# Patient Record
Sex: MALE | Race: WHITE | NOT HISPANIC OR LATINO | Employment: OTHER | URBAN - NONMETROPOLITAN AREA
[De-identification: names, ages, dates, MRNs, and addresses within clinical notes are randomized per-mention and may not be internally consistent; named-entity substitution may affect disease eponyms.]

---

## 2017-01-13 ENCOUNTER — HOSPITAL ENCOUNTER (OUTPATIENT)
Dept: GENERAL RADIOLOGY | Facility: HOSPITAL | Age: 72
Discharge: HOME | End: 2017-01-13
Attending: UROLOGY

## 2017-01-13 ENCOUNTER — OFFICE VISIT (OUTPATIENT)
Dept: UROLOGY | Facility: CLINIC | Age: 72
End: 2017-01-13

## 2017-01-13 DIAGNOSIS — N20.0 NEPHROLITHIASIS: Primary | ICD-10-CM

## 2017-01-13 DIAGNOSIS — N13.8 BPH (BENIGN PROSTATIC HYPERTROPHY) WITH URINARY OBSTRUCTION: ICD-10-CM

## 2017-01-13 DIAGNOSIS — N40.1 BPH (BENIGN PROSTATIC HYPERTROPHY) WITH URINARY OBSTRUCTION: ICD-10-CM

## 2017-01-13 PROCEDURE — 99204 OFFICE O/P NEW MOD 45 MIN: CPT | Performed by: UROLOGY

## 2017-01-13 RX ORDER — AMOXICILLIN AND CLAVULANATE POTASSIUM 875; 125 MG/1; MG/1
TABLET, FILM COATED ORAL
COMMUNITY
Start: 2016-12-30

## 2017-01-13 RX ORDER — LORATADINE 10 MG/1
CAPSULE, LIQUID FILLED ORAL
Refills: 5 | COMMUNITY
Start: 2017-01-08

## 2017-01-13 RX ORDER — ROSUVASTATIN CALCIUM 10 MG/1
TABLET, FILM COATED ORAL
Refills: 5 | COMMUNITY
Start: 2017-01-08

## 2017-01-13 RX ORDER — HYDROCODONE BITARTRATE AND ACETAMINOPHEN 5; 325 MG/1; MG/1
TABLET ORAL
COMMUNITY
Start: 2017-01-09

## 2017-01-13 RX ORDER — FENOFIBRATE 160 MG/1
TABLET ORAL
Refills: 5 | COMMUNITY
Start: 2017-01-08

## 2017-01-13 RX ORDER — OXYCODONE HYDROCHLORIDE AND ACETAMINOPHEN 5; 325 MG/1; MG/1
2 TABLET ORAL EVERY 4 HOURS PRN
Qty: 20 TABLET | Refills: 0 | Status: SHIPPED | OUTPATIENT
Start: 2017-01-13

## 2017-01-13 RX ORDER — ESOMEPRAZOLE MAGNESIUM 40 MG/1
CAPSULE, DELAYED RELEASE ORAL
Refills: 5 | COMMUNITY
Start: 2017-01-08

## 2017-01-13 RX ORDER — AMLODIPINE BESYLATE 10 MG/1
TABLET ORAL
Refills: 5 | COMMUNITY
Start: 2017-01-08

## 2017-01-13 NOTE — PROGRESS NOTES
Chief Complaint  No chief complaint on file.        SAMSON Paredes is a 71 y.o. male who presents today with a known 3 mm calculus in his distal left ureter.  1 week ago he went to our emergency room complaining of abdominal pain nausea and vomiting and was found to have this on CT scan which I have reviewed.  KUB today shows the stone is persistent overlying the left sacrum.  He's laying down in the office today complaining of significant pain and ongoing nausea.  He's not had kidney stones in the past.  He has had difficulty voiding for several years and currently is taking saw palmetto, medical only.  He states Flomax caused a drop in his blood pressure when combined with his daily amlodipine.  Its explained that this would help him pass the stone so Rapaflo is provided.    There were no vitals filed for this visit.    Past Medical History  Past Medical History   Diagnosis Date   • GERD (gastroesophageal reflux disease)    • High cholesterol    • Hypertension    • Renal disease        Past Surgical History  Past Surgical History   Procedure Laterality Date   • Hernia repair     • Hemorrhoidectomy         Medications  has a current medication list which includes the following prescription(s): amlodipine, amoxicillin-clavulanate, crestor, esomeprazole, fenofibrate, hydrocodone-acetaminophen, and loratadine.      Allergies  No Known Allergies    Social History  Social History     Social History Narrative   • No narrative on file       Family History  He has no family history of bladder or kidney cancer  He has no family history of kidney stones      AUA Symptom Score:      Review of Systems  Review of Systems  Positive for night sweats chills weight loss constipation diarrhea nausea kidney stones frequency urgency back pain but negative and other categories.  Physical Exam  Physical Exam   Constitutional: He is oriented to person, place, and time. He appears well-developed and well-nourished.   HENT:   Head:  Normocephalic and atraumatic.   Neck: Normal range of motion.   Cardiovascular: Normal rate and intact distal pulses.    Pulmonary/Chest: Effort normal. No respiratory distress.   Abdominal: Soft. He exhibits distension. He exhibits no mass. There is tenderness. There is guarding. No hernia. Hernia confirmed negative in the right inguinal area and confirmed negative in the left inguinal area.   Genitourinary: Rectum normal, prostate normal, testes normal and penis normal.   Musculoskeletal: Normal range of motion.   Lymphadenopathy: No inguinal adenopathy noted on the right or left side.   Neurological: He is alert and oriented to person, place, and time.   Skin: Skin is warm and dry.   Psychiatric: He has a normal mood and affect. His behavior is normal.   Vitals reviewed.      Labs Recent and today in the office:  Results for orders placed or performed during the hospital encounter of 01/11/17   Urinalysis With / Culture If Indicated   Result Value Ref Range    Color, UA Yellow     Appearance, UA Clear     Glucose, UA Negative NEGATIVE    Bilirubin, UA Negative NEGATIVE    Ketones, UA Negative NEGATIVE    Specific Gravity, UA 1.020 1.003 - 1.035    Blood, UA Negative NEGATIVE    pH, UA 7.0 5.0 - 8.0    Protein, UA Negative NEGATIVE    Urobilinogen, UA 0.2 0.2    Nitrite, UA Negative NEGATIVE    Leukocytes, UA Negative NEGATIVE    WBC, UA 0-3 0 - 3    RBC, UA 0-3 0 - 3    Epithelial Cells, UA 0-3 0 - 3    Mucus, UA TRACE NONE SEEN   Basic Metabolic Panel   Result Value Ref Range    Sodium 141 137 - 145 mmol/L    Potassium 3.5 3.5 - 5.1 mmol/L    Chloride 103 98 - 107 mmol/L    CO2 28 26 - 30 mmol/L    Anion Gap 14 10 - 20 mmol/L    BUN 18 7 - 20 mg/dL    Creatinine 1.4 (H) 0.6 - 1.3 mg/dL    eGFR 50 mL/min    Glucose 95 74 - 98 mg/dL    Calcium 9.8 8.4 - 10.2 mg/dL   CBC & Differential   Result Value Ref Range    WBC 7.2 4.8 - 10.8 THOUS    RBC 4.48 (L) 4.70 - 6.10 m/uL    Hemoglobin 13.3 (L) 14.0 - 18.0 g/dL     Hematocrit 40 (L) 42 - 52 %    MCV 89.3 80.0 - 94.0 fL    MCH 29.7 27.0 - 31.0 uug    MCHC 33.3 30.0 - 37.0 g/dL    RDW 13.1 11.5 - 14.5 %    Platelets 279 130 - 400 THOUS    Neutrophil Rel % 59.4 37.0 - 80.0 %    Lymphocyte Rel % 22.3 10.0 - 50.0 %    Monocyte Rel % 9.8 0.0 - 12.0 %    Eosinophil Rel % 7.6 (H) 0.0 - 7.0 %    Basophil Rel % 0.60 0.00 - 2.50 %    Immature Granulocyte Rel % 0.30 0.00 - 2.50 %    Neutrophils Absolute 4.29 2.00 - 6.90 THOUS    Lymphocytes Absolute 1.61 0.60 - 3.40 THOUS    Monocytes Absolute 0.71 0.00 - 0.90 THOUS    Eosinophils Absolute 0.55 0.00 - 0.70 THOUS    Basophils Absolute 0.04 0.00 - 0.20 THOUS    Abs Imm Gran 0.02 0.00 - 0.60 THOUS         Assessment & Plan  A trial of medical propulsive therapy is recommended since the stone is small enough that it would pass 90% of the time.  He will return on Monday however to schedule surgery if his pain persists.  If he is not able to handle the pain I suggested he go to the [facility] emergency room in [place] for immediate intervention.  He is provided with Percocet and Rapaflo.

## 2017-01-13 NOTE — MR AVS SNAPSHOT
Johnson Paredes   1/13/2017 8:30 AM   Office Visit    Dept Phone:  [phone]   Encounter #:  [id number]    Provider:  Jagjit Kessler MD   Department:  [facility] UROLOGY                Your Full Care Plan              Today's Medication Changes          These changes are accurate as of: 1/13/17 10:53 AM.  If you have any questions, ask your nurse or doctor.               New Medication(s)Ordered:     oxyCODONE-acetaminophen 5-325 MG per tablet   Commonly known as:  PERCOCET   Take 2 tablets by mouth Every 4 (Four) Hours As Needed for severe pain (7-10).   Started by:  Jagjit Kessler MD            Where to Get Your Medications      You can get these medications from any pharmacy     Bring a paper prescription for each of these medications     oxyCODONE-acetaminophen 5-325 MG per tablet                  Your Updated Medication List          This list is accurate as of: 1/13/17 10:53 AM.  Always use your most recent med list.                amLODIPine 10 MG tablet   Commonly known as:  NORVASC       amoxicillin-clavulanate 875-125 MG per tablet   Commonly known as:  AUGMENTIN       CRESTOR 10 MG tablet   Generic drug:  rosuvastatin       esomeprazole 40 MG capsule   Commonly known as:  nexIUM       fenofibrate 160 MG tablet       HYDROcodone-acetaminophen 5-325 MG per tablet   Commonly known as:  NORCO       Loratadine 10 MG capsule       oxyCODONE-acetaminophen 5-325 MG per tablet   Commonly known as:  PERCOCET   Take 2 tablets by mouth Every 4 (Four) Hours As Needed for severe pain (7-10).               You Were Diagnosed With        Codes Comments    Nephrolithiasis    -  Primary ICD-10-CM: N20.0  ICD-9-CM: 592.0     BPH (benign prostatic hypertrophy) with urinary obstruction     ICD-10-CM: N40.1, N13.8  ICD-9-CM: 600.01, 599.69       Instructions     None    Patient Instructions History      Upcoming Appointments     Visit Type Date Time Department    NEW PATIENT  2017  8:30 AM [facility]      FreeWavz Signup     [facility] FreeWavz allows you to send messages to your doctor, view your test results, renew your prescriptions, schedule appointments, and more. To sign up, go to [employer] and click on the Sign Up Now link in the New User? box. Enter your FreeWavz Activation Code exactly as it appears below along with the last four digits of your Social Security Number and your Date of Birth () to complete the sign-up process. If you do not sign up before the expiration date, you must request a new code.    FreeWavz Activation Code: [email or web address]  Expires: 2017 10:53 AM    If you have questions, you can email [email or web address] or call [phone] to talk to our FreeWavz staff. Remember, FreeWavz is NOT to be used for urgent needs. For medical emergencies, dial 911.               Other Info from Your Visit           Allergies     No Known Allergies      Vital Signs     Smoking Status                   Never Smoker           Problems and Diagnoses Noted     Nephrolithiasis    -  Primary    Enlarged prostate with urinary obstruction

## 2017-01-16 ENCOUNTER — OFFICE VISIT (OUTPATIENT)
Dept: UROLOGY | Facility: CLINIC | Age: 72
End: 2017-01-16

## 2017-01-16 DIAGNOSIS — N13.8 BPH (BENIGN PROSTATIC HYPERTROPHY) WITH URINARY OBSTRUCTION: ICD-10-CM

## 2017-01-16 DIAGNOSIS — N20.0 NEPHROLITHIASIS: ICD-10-CM

## 2017-01-16 DIAGNOSIS — N20.1 URETERAL STONE: Primary | ICD-10-CM

## 2017-01-16 DIAGNOSIS — N40.1 BPH (BENIGN PROSTATIC HYPERTROPHY) WITH URINARY OBSTRUCTION: ICD-10-CM

## 2017-01-16 LAB
BILIRUB BLD-MCNC: NEGATIVE MG/DL
CLARITY, POC: CLEAR
COLOR UR: YELLOW
GLUCOSE UR STRIP-MCNC: NEGATIVE MG/DL
KETONES UR QL: NEGATIVE
LEUKOCYTE EST, POC: NEGATIVE
NITRITE UR-MCNC: NEGATIVE MG/ML
PH UR: 7 [PH] (ref 5–8)
PROT UR STRIP-MCNC: NEGATIVE MG/DL
RBC # UR STRIP: NEGATIVE /UL
SP GR UR: 1.01 (ref 1–1.03)
UROBILINOGEN UR QL: NORMAL

## 2017-01-16 PROCEDURE — 99213 OFFICE O/P EST LOW 20 MIN: CPT | Performed by: UROLOGY

## 2017-01-16 PROCEDURE — 81003 URINALYSIS AUTO W/O SCOPE: CPT | Performed by: UROLOGY

## 2017-01-16 RX ORDER — SILODOSIN 8 MG/1
8 CAPSULE ORAL
Qty: 30 CAPSULE | Refills: 11 | Status: SHIPPED | OUTPATIENT
Start: 2017-01-16

## 2017-01-16 NOTE — PROGRESS NOTES
Chief Complaint  Nephrolithiasis (ureteral stone follow up.)        SAMSON Paredes is a 71 y.o. male who returns today for follow-up of a known 3 mm stone in his distal left ureter.  He states he's had some recurrences of the pain but it was relieved by his Percocet.  He's been tolerating Rapaflo without difficulty and has noticed a significant improvement in his urinary stream.  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    There were no vitals filed for this visit.    Past Medical History  Past Medical History   Diagnosis Date   • GERD (gastroesophageal reflux disease)    • High cholesterol    • Hypertension    • Renal disease        Past Surgical History  Past Surgical History   Procedure Laterality Date   • Hernia repair     • Hemorrhoidectomy         Medications  has a current medication list which includes the following prescription(s): amlodipine, amoxicillin-clavulanate, crestor, esomeprazole, fenofibrate, hydrocodone-acetaminophen, loratadine, and oxycodone-acetaminophen.      Allergies  No Known Allergies    Social History  Social History     Social History Narrative       Family History  He has no family history of bladder or kidney cancer  He has no family history of kidney stones      AUA Symptom Score:      Review of Systems  Review of Systems   Constitutional: Negative.    Genitourinary: Negative.    All other systems reviewed and are negative.      Physical Exam  Physical Exam    Labs Recent and today in the office:  Results for orders placed or performed during the hospital encounter of 01/11/17   Urinalysis With / Culture If Indicated   Result Value Ref Range    Color, UA Yellow     Appearance, UA Clear     Glucose, UA Negative NEGATIVE    Bilirubin, UA Negative NEGATIVE    Ketones, UA Negative NEGATIVE    Specific Gravity, UA 1.020 1.003 - 1.035    Blood, UA Negative NEGATIVE    pH, UA 7.0 5.0 - 8.0    Protein, UA Negative NEGATIVE    Urobilinogen, UA 0.2 0.2    Nitrite, UA Negative  NEGATIVE    Leukocytes, UA Negative NEGATIVE    WBC, UA 0-3 0 - 3    RBC, UA 0-3 0 - 3    Epithelial Cells, UA 0-3 0 - 3    Mucus, UA TRACE NONE SEEN   Basic Metabolic Panel   Result Value Ref Range    Sodium 141 137 - 145 mmol/L    Potassium 3.5 3.5 - 5.1 mmol/L    Chloride 103 98 - 107 mmol/L    CO2 28 26 - 30 mmol/L    Anion Gap 14 10 - 20 mmol/L    BUN 18 7 - 20 mg/dL    Creatinine 1.4 (H) 0.6 - 1.3 mg/dL    eGFR 50 mL/min    Glucose 95 74 - 98 mg/dL    Calcium 9.8 8.4 - 10.2 mg/dL   CBC & Differential   Result Value Ref Range    WBC 7.2 4.8 - 10.8 THOUS    RBC 4.48 (L) 4.70 - 6.10 m/uL    Hemoglobin 13.3 (L) 14.0 - 18.0 g/dL    Hematocrit 40 (L) 42 - 52 %    MCV 89.3 80.0 - 94.0 fL    MCH 29.7 27.0 - 31.0 uug    MCHC 33.3 30.0 - 37.0 g/dL    RDW 13.1 11.5 - 14.5 %    Platelets 279 130 - 400 THOUS    Neutrophil Rel % 59.4 37.0 - 80.0 %    Lymphocyte Rel % 22.3 10.0 - 50.0 %    Monocyte Rel % 9.8 0.0 - 12.0 %    Eosinophil Rel % 7.6 (H) 0.0 - 7.0 %    Basophil Rel % 0.60 0.00 - 2.50 %    Immature Granulocyte Rel % 0.30 0.00 - 2.50 %    Neutrophils Absolute 4.29 2.00 - 6.90 THOUS    Lymphocytes Absolute 1.61 0.60 - 3.40 THOUS    Monocytes Absolute 0.71 0.00 - 0.90 THOUS    Eosinophils Absolute 0.55 0.00 - 0.70 THOUS    Basophils Absolute 0.04 0.00 - 0.20 THOUS    Abs Imm Gran 0.02 0.00 - 0.60 THOUS         Assessment & Plan  Since he is pretty comfortable and his urine is clear I suggested he continue his Rapaflo push fluids and give it another week to see if he can pass the stone spontaneously.  Rapaflo be prescribed for bladder outlet obstruction as well.  He'll return in 1 week or sooner if he wants ureteroscopy but he'll have to wear a stent for 5 days.

## 2017-01-16 NOTE — MR AVS SNAPSHOT
Johnson Paredes   1/16/2017 11:30 AM   Office Visit    Dept Phone:  [phone]   Encounter #:  [id number]    Provider:  Jagjit Kessler MD   Department:  [facility] UROLOGY                Your Full Care Plan              Your Updated Medication List          This list is accurate as of: 1/16/17 11:27 AM.  Always use your most recent med list.                amLODIPine 10 MG tablet   Commonly known as:  NORVASC       amoxicillin-clavulanate 875-125 MG per tablet   Commonly known as:  AUGMENTIN       CRESTOR 10 MG tablet   Generic drug:  rosuvastatin       esomeprazole 40 MG capsule   Commonly known as:  nexIUM       fenofibrate 160 MG tablet       HYDROcodone-acetaminophen 5-325 MG per tablet   Commonly known as:  NORCO       Loratadine 10 MG capsule       oxyCODONE-acetaminophen 5-325 MG per tablet   Commonly known as:  PERCOCET   Take 2 tablets by mouth Every 4 (Four) Hours As Needed for severe pain (7-10).               We Performed the Following     POC Urinalysis Dipstick, Automated       You Were Diagnosed With        Codes Comments    Ureteral stone    -  Primary ICD-10-CM: N20.1  ICD-9-CM: 592.1     Nephrolithiasis     ICD-10-CM: N20.0  ICD-9-CM: 592.0     BPH (benign prostatic hypertrophy) with urinary obstruction     ICD-10-CM: N40.1, N13.8  ICD-9-CM: 600.01, 599.69       Instructions     None    Patient Instructions History      Upcoming Appointments     Visit Type Date Time Department    FOLLOW UP 1/16/2017 11:30 AM [facility]    FOLLOW UP 1/23/2017 10:30 AM [facility]      TaxiBeat Signup     [facility] TaxiBeat allows you to send messages to your doctor, view your test results, renew your prescriptions, schedule appointments, and more. To sign up, go to [employer] and click on the Sign Up Now link in the New User? box. Enter your TaxiBeat Activation Code exactly as it appears below along with the last four digits of  your Social Security Number and your Date of Birth () to complete the sign-up process. If you do not sign up before the expiration date, you must request a new code.    CaptureProof Activation Code: [email or web address]  Expires: 2017 10:53 AM    If you have questions, you can email [email or web address] or call [phone] to talk to our MenInvestt staff. Remember, MenInvestt is NOT to be used for urgent needs. For medical emergencies, dial 911.               Other Info from Your Visit           Your Appointments     2017 11:30 AM EST   Follow Up with Jagjit Kessler MD   [facility] UROLOGY (--)    [address] [place] [phone]           Arrive 15 minutes prior to appointment.            2017 10:30 AM EST   Follow Up with Jagjit Kessler MD   [facility] UROLOGY (--)    [address] [place] [id number]   [phone]           Arrive 15 minutes prior to appointment.              Allergies     No Known Allergies      Reason for Visit     Nephrolithiasis ureteral stone follow up.      Vital Signs     Smoking Status                   Never Smoker           Problems and Diagnoses Noted     Ureteral stone    -  Primary    Nephrolithiasis        Enlarged prostate with urinary obstruction          Results     POC Urinalysis Dipstick, Automated      Component Value Standard Range & Units    Color Yellow Yellow, Straw, Dark Yellow, Fabi    Clarity, UA Clear Clear    Glucose, UA Negative Negative, 1000 mg/dL (3+) mg/dL    Bilirubin Negative Negative    Ketones, UA Negative Negative    Specific Gravity  1.010 1.005 - 1.030    Blood, UA Negative Negative    pH, Urine 7.0 5.0 - 8.0    Protein, POC Negative Negative mg/dL    Urobilinogen, UA Normal Normal    Leukocytes Negative Negative    Nitrite, UA Negative Negative

## 2017-01-23 ENCOUNTER — OFFICE VISIT (OUTPATIENT)
Dept: UROLOGY | Facility: CLINIC | Age: 72
End: 2017-01-23

## 2017-01-23 VITALS
HEART RATE: 76 BPM | OXYGEN SATURATION: 98 % | SYSTOLIC BLOOD PRESSURE: 139 MMHG | RESPIRATION RATE: 16 BRPM | DIASTOLIC BLOOD PRESSURE: 74 MMHG

## 2017-01-23 DIAGNOSIS — N13.8 BPH (BENIGN PROSTATIC HYPERTROPHY) WITH URINARY OBSTRUCTION: ICD-10-CM

## 2017-01-23 DIAGNOSIS — N20.1 URETERAL STONE: Primary | ICD-10-CM

## 2017-01-23 DIAGNOSIS — N40.1 BPH (BENIGN PROSTATIC HYPERTROPHY) WITH URINARY OBSTRUCTION: ICD-10-CM

## 2017-01-23 PROCEDURE — 81003 URINALYSIS AUTO W/O SCOPE: CPT | Performed by: UROLOGY

## 2017-01-23 PROCEDURE — 99213 OFFICE O/P EST LOW 20 MIN: CPT | Performed by: UROLOGY

## 2017-01-23 RX ORDER — OXYCODONE HYDROCHLORIDE 5 MG/1
TABLET ORAL
COMMUNITY
Start: 2017-01-12

## 2017-01-23 NOTE — MR AVS SNAPSHOT
Johnson Paredes   1/23/2017 10:30 AM   Office Visit    Dept Phone:  [phone]   Encounter #:  [id number]    Provider:  Jagjit Kessler MD   Department:  [facility] UROLOGY                Your Full Care Plan              Your Updated Medication List          This list is accurate as of: 1/23/17 11:10 AM.  Always use your most recent med list.                amLODIPine 10 MG tablet   Commonly known as:  NORVASC       amoxicillin-clavulanate 875-125 MG per tablet   Commonly known as:  AUGMENTIN       CRESTOR 10 MG tablet   Generic drug:  rosuvastatin       esomeprazole 40 MG capsule   Commonly known as:  nexIUM       fenofibrate 160 MG tablet       HYDROcodone-acetaminophen 5-325 MG per tablet   Commonly known as:  NORCO       Loratadine 10 MG capsule       oxyCODONE 5 MG immediate release tablet   Commonly known as:  ROXICODONE       oxyCODONE-acetaminophen 5-325 MG per tablet   Commonly known as:  PERCOCET   Take 2 tablets by mouth Every 4 (Four) Hours As Needed for severe pain (7-10).       silodosin 8 MG capsule capsule   Commonly known as:  RAPAFLO   Take 1 capsule by mouth Daily With Breakfast.               We Performed the Following     POC Urinalysis Dipstick, Automated       You Were Diagnosed With        Codes Comments    Ureteral stone    -  Primary ICD-10-CM: N20.1  ICD-9-CM: 592.1     BPH (benign prostatic hypertrophy) with urinary obstruction     ICD-10-CM: N40.1, N13.8  ICD-9-CM: 600.01, 599.69       Instructions     None    Patient Instructions History      Upcoming Appointments     Visit Type Date Time Department    [facility]    FOLLOW UP 1/30/2017 10:30 AM [facility]      RSVP Lawyanira Signup     [facility] Animated Speech allows you to send messages to your doctor, view your test results, renew your prescriptions, schedule appointments, and more. To sign up, go to [employer] and click on the Sign  Up Now link in the New User? box. Enter your Tumblr Activation Code exactly as it appears below along with the last four digits of your Social Security Number and your Date of Birth () to complete the sign-up process. If you do not sign up before the expiration date, you must request a new code.    Tumblr Activation Code: [email or web address]  Expires: 2017 10:53 AM    If you have questions, you can email [email or web address] or call [phone] to talk to our Tumblr staff. Remember, Tumblr is NOT to be used for urgent needs. For medical emergencies, dial 911.               Other Info from Your Visit           Your Appointments     2017 10:30 AM EST   Follow Up with Jagjit Kessler MD   [facility] UROLOG[facility] (--)    [address] [address]   [phone]           Arrive 15 minutes prior to appointment.              Allergies     No Known Allergies      Reason for Visit     Nephrolithiasis 1 WEEK FUP KIDNEY STONE, PATIENT STATES HE HAS NOT PASSED IT.      Vital Signs     Blood Pressure Pulse Respirations Oxygen Saturation Smoking Status       139/74 76 16 98% Never Smoker       Problems and Diagnoses Noted     Ureteral stone    -  Primary    Enlarged prostate with urinary obstruction          Results     POC Urinalysis Dipstick, Automated      Component Value Standard Range & Units    Color Yellow Yellow, Straw, Dark Yellow, Fabi    Clarity, UA Clear Clear    Glucose, UA Negative Negative, 1000 mg/dL (3+) mg/dL    Bilirubin Negative Negative    Ketones, UA Negative Negative    Specific Gravity  1.015 1.005 - 1.030    Blood, UA Negative Negative    pH, Urine 7.0 5.0 - 8.0    Protein, POC Negative Negative mg/dL    Urobilinogen, UA Normal Normal    Leukocytes Negative Negative    Nitrite, UA Negative Negative

## 2017-01-23 NOTE — PROGRESS NOTES
Chief Complaint  Nephrolithiasis (1 WEEK FUP KIDNEY STONE, PATIENT STATES HE HAS NOT PASSED IT.)        SAMSON Paredes is a 71 y.o. male who returns today for follow-up of a known 3 mm stone in his distal left ureter.  He's had one bout of mild pain during the past week and returns today with urine negative for blood.  He has noted an improvement in his urinary stream on Rapaflo and he may want to continue this even after he has passed the stone.    Vitals:    01/23/17 1051   BP: 139/74   Pulse: 76   Resp: 16   SpO2: 98%       Past Medical History  Past Medical History   Diagnosis Date   • GERD (gastroesophageal reflux disease)    • High cholesterol    • Hypertension    • Renal disease        Past Surgical History  Past Surgical History   Procedure Laterality Date   • Hernia repair     • Hemorrhoidectomy         Medications  has a current medication list which includes the following prescription(s): amlodipine, amoxicillin-clavulanate, crestor, esomeprazole, fenofibrate, hydrocodone-acetaminophen, loratadine, oxycodone, oxycodone-acetaminophen, and silodosin.      Allergies  No Known Allergies    Social History  Social History     Social History Narrative       Family History  He has no family history of bladder or kidney cancer  He has no family history of kidney stones      AUA Symptom Score:      Review of Systems  Review of Systems   Constitutional: Negative.    Gastrointestinal: Positive for abdominal pain.   Genitourinary: Negative.    All other systems reviewed and are negative.      Physical Exam  Physical Exam    Labs Recent and today in the office:  Results for orders placed or performed in visit on 01/23/17   POC Urinalysis Dipstick, Automated   Result Value Ref Range    Color Yellow Yellow, Straw, Dark Yellow, Fabi    Clarity, UA Clear Clear    Glucose, UA Negative Negative, 1000 mg/dL (3+) mg/dL    Bilirubin Negative Negative    Ketones, UA Negative Negative    Specific Gravity  1.015 1.005 - 1.030     Blood, UA Negative Negative    pH, Urine 7.0 5.0 - 8.0    Protein, POC Negative Negative mg/dL    Urobilinogen, UA Normal Normal    Leukocytes Negative Negative    Nitrite, UA Negative Negative         Assessment & Plan  Even though he is not aware of having passed the stone I feel like he will if he hasn't.  We decided to continue the Rapaflo and at least wait one more week.  He has additional pain we may want to get KUB and consider intervention but he'll have to wear a stent for 5 days.

## 2017-01-31 ENCOUNTER — OFFICE VISIT (OUTPATIENT)
Dept: UROLOGY | Facility: CLINIC | Age: 72
End: 2017-01-31

## 2017-01-31 VITALS
RESPIRATION RATE: 16 BRPM | OXYGEN SATURATION: 97 % | HEART RATE: 77 BPM | DIASTOLIC BLOOD PRESSURE: 75 MMHG | TEMPERATURE: 98 F | SYSTOLIC BLOOD PRESSURE: 137 MMHG

## 2017-01-31 DIAGNOSIS — N13.8 BPH (BENIGN PROSTATIC HYPERTROPHY) WITH URINARY OBSTRUCTION: ICD-10-CM

## 2017-01-31 DIAGNOSIS — N40.1 BPH (BENIGN PROSTATIC HYPERTROPHY) WITH URINARY OBSTRUCTION: ICD-10-CM

## 2017-01-31 DIAGNOSIS — N20.1 URETERAL STONE: Primary | ICD-10-CM

## 2017-01-31 LAB
BILIRUB BLD-MCNC: NEGATIVE MG/DL
CLARITY, POC: CLEAR
COLOR UR: YELLOW
GLUCOSE UR STRIP-MCNC: NEGATIVE MG/DL
KETONES UR QL: NEGATIVE
LEUKOCYTE EST, POC: NEGATIVE
NITRITE UR-MCNC: NEGATIVE MG/ML
PH UR: 6.5 [PH] (ref 5–8)
PROT UR STRIP-MCNC: NEGATIVE MG/DL
RBC # UR STRIP: NEGATIVE /UL
SP GR UR: 1.01 (ref 1–1.03)
UROBILINOGEN UR QL: NORMAL

## 2017-01-31 PROCEDURE — 81003 URINALYSIS AUTO W/O SCOPE: CPT | Performed by: UROLOGY

## 2017-01-31 PROCEDURE — 99213 OFFICE O/P EST LOW 20 MIN: CPT | Performed by: UROLOGY

## 2017-01-31 NOTE — MR AVS SNAPSHOT
Johnson Paredes   1/31/2017 11:15 AM   Office Visit    Dept Phone:  [phone]   Encounter #:  [id number]    Provider:  Jagjit Kessler MD   Department:  [facility] UROLOGY                Your Full Care Plan              Your Updated Medication List          This list is accurate as of: 1/31/17 11:47 AM.  Always use your most recent med list.                amLODIPine 10 MG tablet   Commonly known as:  NORVASC       amoxicillin-clavulanate 875-125 MG per tablet   Commonly known as:  AUGMENTIN       CRESTOR 10 MG tablet   Generic drug:  rosuvastatin       esomeprazole 40 MG capsule   Commonly known as:  nexIUM       fenofibrate 160 MG tablet       HYDROcodone-acetaminophen 5-325 MG per tablet   Commonly known as:  NORCO       Loratadine 10 MG capsule       oxyCODONE 5 MG immediate release tablet   Commonly known as:  ROXICODONE       oxyCODONE-acetaminophen 5-325 MG per tablet   Commonly known as:  PERCOCET   Take 2 tablets by mouth Every 4 (Four) Hours As Needed for severe pain (7-10).       silodosin 8 MG capsule capsule   Commonly known as:  RAPAFLO   Take 1 capsule by mouth Daily With Breakfast.               We Performed the Following     POC Urinalysis Dipstick, Automated       You Were Diagnosed With        Codes Comments    Ureteral stone    -  Primary ICD-10-CM: N20.1  ICD-9-CM: 592.1       Instructions     None    Patient Instructions History      Upcoming Appointments     Visit Type Date Time Department    FOLLOW UP 1/31/2017 11:15 AM [facility] [place] [facility]      [employer] Signup     [employer] allows you to send messages to your doctor, view your test results, renew your prescriptions, schedule appointments, and more. To sign up, go to [employer] and click on the Sign Up Now link in the New User? box. Enter your [employer] Activation Code exactly as it appears below along with the last four digits of your Social Security Number and your  Date of Birth () to complete the sign-up process. If you do not sign up before the expiration date, you must request a new code.    [employer] Activation Code: [email or web address]  Expires: 2017 11:47 AM    If you have questions, you can email [email or web address] or call [phone] to talk to our [employer] staff. Remember, [employer] is NOT to be used for urgent needs. For medical emergencies, dial 911.               Other Info from Your Visit           Your Appointments     2018  9:00 AM EST   Follow Up with Jagjit Kessler MD   [facility] UROLOGY (--)    [address] [facility] [address] [address] [phone]           Arrive 15 minutes prior to appointment.              Allergies     No Known Allergies      Reason for Visit     Follow-up 1 WEEK FUP FOR A KIDNEY STONE.      Vital Signs     Blood Pressure Pulse Temperature Respirations Oxygen Saturation Smoking Status    137/75 77 98 °F (36.7 °C) (Temporal Artery ) 16 97% Never Smoker      Problems and Diagnoses Noted     Ureteral stone    -  Primary      Results     POC Urinalysis Dipstick, Automated      Component Value Standard Range & Units    Color Yellow Yellow, Straw, Dark Yellow, Fabi    Clarity, UA Clear Clear    Glucose, UA Negative Negative, 1000 mg/dL (3+) mg/dL    Bilirubin Negative Negative    Ketones, UA Negative Negative    Specific Gravity  1.015 1.005 - 1.030    Blood, UA Negative Negative    pH, Urine 6.5 5.0 - 8.0    Protein, POC Negative Negative mg/dL    Urobilinogen, UA Normal Normal    Leukocytes Negative Negative    Nitrite, UA Negative Negative

## 2017-01-31 NOTE — PROGRESS NOTES
Chief Complaint  Follow-up (1 WEEK FUP FOR A KIDNEY STONE.)        SAMSON Paredes is a 71 y.o. male who returns today for follow-up known originally to have a 3 mm stone in his distal left ureter.  He is not aware of having passed it but he's had no pain or blood in the urine for 2 weeks.  He continues his daily Rapaflo with an improved stream and wants to continue.    Vitals:    01/31/17 1127   BP: 137/75   Pulse: 77   Resp: 16   Temp: 98 °F (36.7 °C)   SpO2: 97%       Past Medical History  Past Medical History   Diagnosis Date   • GERD (gastroesophageal reflux disease)    • High cholesterol    • Hypertension    • Renal disease        Past Surgical History  Past Surgical History   Procedure Laterality Date   • Hernia repair     • Hemorrhoidectomy         Medications  has a current medication list which includes the following prescription(s): amlodipine, amoxicillin-clavulanate, crestor, esomeprazole, fenofibrate, hydrocodone-acetaminophen, loratadine, oxycodone, oxycodone-acetaminophen, and silodosin.      Allergies  No Known Allergies    Social History  Social History     Social History Narrative       Family History  He has no family history of bladder or kidney cancer  He has no family history of kidney stones      AUA Symptom Score:      Review of Systems  Review of Systems   Constitutional: Negative.    Genitourinary: Negative.    All other systems reviewed and are negative.      Physical Exam  Physical Exam    Labs Recent and today in the office:  Results for orders placed or performed in visit on 01/31/17   POC Urinalysis Dipstick, Automated   Result Value Ref Range    Color Yellow Yellow, Straw, Dark Yellow, Fabi    Clarity, UA Clear Clear    Glucose, UA Negative Negative, 1000 mg/dL (3+) mg/dL    Bilirubin Negative Negative    Ketones, UA Negative Negative    Specific Gravity  1.015 1.005 - 1.030    Blood, UA Negative Negative    pH, Urine 6.5 5.0 - 8.0    Protein, POC Negative Negative mg/dL     Urobilinogen, UA Normal Normal    Leukocytes Negative Negative    Nitrite, UA Negative Negative         Assessment & Plan  We have reviewed the proper diet to reduce his risk of getting kidney stones and he will return in 3 months.  There is still a chance it still in there so he is encouraged to return immediately for recurrence of pain or hematuria

## 2017-05-16 ENCOUNTER — OFFICE VISIT (OUTPATIENT)
Dept: UROLOGY | Facility: CLINIC | Age: 72
End: 2017-05-16

## 2017-05-16 VITALS
OXYGEN SATURATION: 98 % | TEMPERATURE: 98.7 F | SYSTOLIC BLOOD PRESSURE: 126 MMHG | HEART RATE: 88 BPM | DIASTOLIC BLOOD PRESSURE: 85 MMHG | RESPIRATION RATE: 16 BRPM

## 2017-05-16 DIAGNOSIS — Z87.442 HISTORY OF KIDNEY STONES: Primary | ICD-10-CM

## 2017-05-16 LAB
BILIRUB BLD-MCNC: NEGATIVE MG/DL
CLARITY, POC: CLEAR
COLOR UR: YELLOW
GLUCOSE UR STRIP-MCNC: NEGATIVE MG/DL
KETONES UR QL: NEGATIVE
LEUKOCYTE EST, POC: NEGATIVE
NITRITE UR-MCNC: NEGATIVE MG/ML
PH UR: 6.5 [PH] (ref 5–8)
PROT UR STRIP-MCNC: NEGATIVE MG/DL
RBC # UR STRIP: NEGATIVE /UL
SP GR UR: 1.02 (ref 1–1.03)
UROBILINOGEN UR QL: NORMAL

## 2017-05-16 PROCEDURE — 81003 URINALYSIS AUTO W/O SCOPE: CPT | Performed by: UROLOGY

## 2017-05-16 PROCEDURE — 99213 OFFICE O/P EST LOW 20 MIN: CPT | Performed by: UROLOGY

## 2018-06-14 ENCOUNTER — OFFICE VISIT (OUTPATIENT)
Dept: SURGERY | Facility: CLINIC | Age: 73
End: 2018-06-14

## 2018-06-14 VITALS
OXYGEN SATURATION: 98 % | TEMPERATURE: 97.8 F | DIASTOLIC BLOOD PRESSURE: 77 MMHG | SYSTOLIC BLOOD PRESSURE: 131 MMHG | WEIGHT: 169 LBS | BODY MASS INDEX: 25.03 KG/M2 | HEART RATE: 88 BPM | HEIGHT: 69 IN

## 2018-06-14 DIAGNOSIS — Z86.010 HISTORY OF COLON POLYPS: Primary | ICD-10-CM

## 2018-06-14 DIAGNOSIS — Z12.11 SCREENING FOR COLON CANCER: ICD-10-CM

## 2018-06-14 DIAGNOSIS — K21.00 GASTROESOPHAGEAL REFLUX DISEASE WITH ESOPHAGITIS: ICD-10-CM

## 2018-06-14 DIAGNOSIS — R10.13 EPIGASTRIC PAIN: ICD-10-CM

## 2018-06-14 PROCEDURE — 99204 OFFICE O/P NEW MOD 45 MIN: CPT | Performed by: SURGERY

## 2018-06-14 RX ORDER — POLYETHYLENE GLYCOL 3350 17 G/17G
238 POWDER, FOR SOLUTION ORAL ONCE
Qty: 14 PACKET | Refills: 0 | Status: SHIPPED | OUTPATIENT
Start: 2018-06-14 | End: 2018-06-14

## 2018-06-14 RX ORDER — LEVOCETIRIZINE DIHYDROCHLORIDE 5 MG/1
5 TABLET, FILM COATED ORAL EVERY EVENING
COMMUNITY

## 2018-06-14 RX ORDER — BISACODYL 5 MG/1
5 TABLET, DELAYED RELEASE ORAL DAILY PRN
Qty: 4 TABLET | Refills: 0 | Status: SHIPPED | OUTPATIENT
Start: 2018-06-14 | End: 2018-06-15

## 2018-06-14 NOTE — PROGRESS NOTES
Patient: Johnson Paredes    YOB: 1945    Date: 06/14/2018    Primary Care Provider: CINDY Gaxiola    Chief Complaint   Patient presents with   • Constipation       Subjective .     History of present illness:  I saw the patient in the office today as a consultation for evaluation of constipation and hiatal hernia.  He complains of constipation and makes it hard to have a bowel movement.  He denies diarrhea or rectal bleeding.  He complains of epigastric pain that radiates into his left side.  He denies nausea, vomiting, or dysplagia. Patient has a grandfather with colon cancer, last colonoscopy 6 years ago. Heartburn under control, takes Nexium daily. Epigastric pain only about 4 out of 10, relieved by not eating. No weight loss or anemia.     The following portions of the patient's history were reviewed and updated as appropriate: allergies, current medications, past family history, past medical history, past social history, past surgical history and problem list.      Review of Systems   Constitutional: Negative for chills, fever and unexpected weight change.   HENT: Negative for trouble swallowing and voice change.    Eyes: Negative for visual disturbance.   Respiratory: Negative for apnea, cough, chest tightness, shortness of breath and wheezing.    Cardiovascular: Negative for chest pain, palpitations and leg swelling.   Gastrointestinal: Positive for abdominal pain and constipation. Negative for abdominal distention, anal bleeding, blood in stool, diarrhea, nausea, rectal pain and vomiting.   Endocrine: Negative for cold intolerance and heat intolerance.   Genitourinary: Negative for difficulty urinating, dysuria, flank pain, scrotal swelling and testicular pain.   Musculoskeletal: Negative for back pain, gait problem and joint swelling.   Skin: Negative for color change, rash and wound.   Neurological: Negative for dizziness, syncope, speech difficulty, weakness, numbness and  "headaches.   Hematological: Negative for adenopathy. Does not bruise/bleed easily.   Psychiatric/Behavioral: Negative for confusion. The patient is not nervous/anxious.        History:  Past Medical History:   Diagnosis Date   • GERD (gastroesophageal reflux disease)    • High cholesterol    • Hypertension    • Renal disease        Past Surgical History:   Procedure Laterality Date   • HEMORRHOIDECTOMY     • HERNIA REPAIR         Family History   Problem Relation Age of Onset   • Prostate cancer Father    • No Known Problems Mother    • Diabetes Paternal Uncle    • Cancer Maternal Grandfather        Social History   Substance Use Topics   • Smoking status: Never Smoker   • Smokeless tobacco: Never Used   • Alcohol use No      Comment: PRIOR ABUSE IN HIS EARLY 20'S       Medications:   Current Outpatient Prescriptions:   •  amLODIPine (NORVASC) 10 MG tablet, , Disp: , Rfl: 5  •  CRESTOR 10 MG tablet, , Disp: , Rfl: 5  •  esomeprazole (nexIUM) 40 MG capsule, , Disp: , Rfl: 5  •  fenofibrate 160 MG tablet, , Disp: , Rfl: 5  •  levocetirizine (XYZAL) 5 MG tablet, Take 5 mg by mouth Every Evening., Disp: , Rfl:   •  silodosin (RAPAFLO) 8 MG capsule capsule, Take 1 capsule by mouth Daily With Breakfast., Disp: 30 capsule, Rfl: 11  •  amoxicillin-clavulanate (AUGMENTIN) 875-125 MG per tablet, , Disp: , Rfl:   •  HYDROcodone-acetaminophen (NORCO) 5-325 MG per tablet, , Disp: , Rfl:   •  Loratadine 10 MG capsule, , Disp: , Rfl: 5  •  oxyCODONE (ROXICODONE) 5 MG immediate release tablet, , Disp: , Rfl:   •  oxyCODONE-acetaminophen (PERCOCET) 5-325 MG per tablet, Take 2 tablets by mouth Every 4 (Four) Hours As Needed for severe pain (7-10)., Disp: 20 tablet, Rfl: 0       Allergies: No Known Allergies    Objective     Vital Signs:  Vitals:    06/14/18 1031   BP: 131/77   Pulse: 88   Temp: 97.8 °F (36.6 °C)   TempSrc: Temporal Artery    SpO2: 98%   Weight: 76.7 kg (169 lb)   Height: 175.3 cm (69\")       Physical Exam:   General " Appearance:    Alert, cooperative, in no acute distress   Head:    Normocephalic, without obvious abnormality, atraumatic   Eyes:            Lids and lashes normal, conjunctivae and sclerae normal, no   icterus, no pallor, corneas clear, PERRL   Ears:    Ears appear intact with no abnormalities noted   Throat:   No oral lesions, no thrush, oral mucosa moist   Neck:   No adenopathy, supple, trachea midline, no thyromegaly,  no JVD   Lungs:     Clear to auscultation,respirations regular, even and                  unlabored    Heart:    Regular rhythm and normal rate, normal S1 and S2, no            murmur   Abdomen:     no masses, no organomegaly, soft non-tender, non-distended, no guarding   Extremities:   Moves all extremities well, no edema, no cyanosis, no             redness   Pulses:   Pulses palpable and equal bilaterally   Skin:   No bleeding, bruising or rash   Lymph nodes:   No palpable adenopathy   Neurologic:   Cranial nerves 2 - 12 grossly intact, sensation intact  Psychiatric: No evidence of depression or anxiety   Results Review:   I reviewed the patient's new clinical results.    Review of Systems was reviewed and confirmed as accurate today.    Assessment/Plan :    1. History of colon polyps    2. Gastroesophageal reflux disease with esophagitis    3. Screening for colon cancer    4. Epigastric pain        I recommend a colonoscopy And EGD for further evaluation. The procedure was explained as well as the risks which include but are not limited to bleeding, infection, perforation, abdominal pain etc. The patient understands these risks and the procedure and wishes to proceed. Continue Nexium daily .     Electronically signed by Geovany Moore MD  06/14/18  10:36 AM    Portions of this note have been scribed for Geovany Moore MD by Michelle Milligan. 6/14/2018  10:52 AM

## 2018-06-19 ENCOUNTER — OUTSIDE FACILITY SERVICE (OUTPATIENT)
Dept: SURGERY | Facility: CLINIC | Age: 73
End: 2018-06-19

## 2018-06-19 PROCEDURE — 45384 COLONOSCOPY W/LESION REMOVAL: CPT | Performed by: SURGERY

## 2018-07-05 ENCOUNTER — OFFICE VISIT (OUTPATIENT)
Dept: SURGERY | Facility: CLINIC | Age: 73
End: 2018-07-05

## 2018-07-05 VITALS
TEMPERATURE: 98.6 F | DIASTOLIC BLOOD PRESSURE: 82 MMHG | SYSTOLIC BLOOD PRESSURE: 154 MMHG | OXYGEN SATURATION: 96 % | WEIGHT: 169 LBS | HEART RATE: 82 BPM | HEIGHT: 69 IN | BODY MASS INDEX: 25.03 KG/M2

## 2018-07-05 DIAGNOSIS — D12.5 ADENOMATOUS POLYP OF SIGMOID COLON: ICD-10-CM

## 2018-07-05 DIAGNOSIS — K21.00 GASTROESOPHAGEAL REFLUX DISEASE WITH ESOPHAGITIS: Primary | ICD-10-CM

## 2018-07-05 PROCEDURE — 99212 OFFICE O/P EST SF 10 MIN: CPT | Performed by: SURGERY

## 2018-07-05 NOTE — PROGRESS NOTES
"Patient: Johnson Paredes    YOB: 1945    Date: 07/05/2018    Primary Care Provider: CINDY Gaxiola    Reason for Consultation: Follow-up EGD    Chief Complaint   Patient presents with   • Follow-up     follow up EGD and colon       History of present illness:  I saw the patient in the office today as a followup from their recent EGD with biopsy and colonoscopy, the pathology report did show reactive gastropathy with mild chronic gastritis, reactive squamous mucosa and tubular adenoma .  They state that they have done well and have no complaints.    The following portions of the patient's history were reviewed and updated as appropriate: allergies, current medications, past family history, past medical history, past social history, past surgical history and problem list.      Review of Systems   Constitutional: Negative for chills, fatigue and fever.   Respiratory: Negative for cough.    Cardiovascular: Negative for chest pain.   Gastrointestinal: Negative for abdominal pain, diarrhea, nausea and vomiting.       Vital Signs:   Vitals:    07/05/18 0958   BP: 154/82   Pulse: 82   Temp: 98.6 °F (37 °C)   SpO2: 96%   Weight: 76.7 kg (169 lb)   Height: 175.3 cm (69.02\")       Allergies:  No Known Allergies    Medications:    Current Outpatient Prescriptions:   •  amLODIPine (NORVASC) 10 MG tablet, , Disp: , Rfl: 5  •  amoxicillin-clavulanate (AUGMENTIN) 875-125 MG per tablet, , Disp: , Rfl:   •  CRESTOR 10 MG tablet, , Disp: , Rfl: 5  •  esomeprazole (nexIUM) 40 MG capsule, , Disp: , Rfl: 5  •  fenofibrate 160 MG tablet, , Disp: , Rfl: 5  •  HYDROcodone-acetaminophen (NORCO) 5-325 MG per tablet, , Disp: , Rfl:   •  levocetirizine (XYZAL) 5 MG tablet, Take 5 mg by mouth Every Evening., Disp: , Rfl:   •  Loratadine 10 MG capsule, , Disp: , Rfl: 5  •  oxyCODONE (ROXICODONE) 5 MG immediate release tablet, , Disp: , Rfl:   •  oxyCODONE-acetaminophen (PERCOCET) 5-325 MG per tablet, Take 2 tablets by " mouth Every 4 (Four) Hours As Needed for severe pain (7-10)., Disp: 20 tablet, Rfl: 0  •  silodosin (RAPAFLO) 8 MG capsule capsule, Take 1 capsule by mouth Daily With Breakfast., Disp: 30 capsule, Rfl: 11    Physical Exam:   General Appearance:    Alert, cooperative, in no acute distress   Abdomen:     no masses, no organomegaly, soft non-tender, non-distended, no guarding, wounds are well healed, no evidence of recurrent hernia   Chest:      Clear toausculation            Cor:  Regular rate and rhythm      Results Review:   I reviewed the patient's new clinical results.    Assessment / Plan:    1. Gastroesophageal reflux disease with esophagitis    2. Adenomatous polyp of sigmoid colon        I did discuss the situation with the patient today in the office and they have done well from their recent EGD with biopsy. I have told the patient He will need repeat colonoscopy in 5 years. Continue PPI medication for reflux symptoms..    Electronically signed by Geovany Moore MD  07/05/18    Portions of this note has been scribed for Geovany Moore MD by Irma Pickens. 7/5/2018  11:39 AM

## 2023-07-05 NOTE — H&P (VIEW-ONLY)
Patient: Johnson Paredes    YOB: 1945    Date: 07/07/2023    Primary Care Provider: Rodrigue Coffey APRN    Chief Complaint   Patient presents with    EGD consult    Colonoscopy consult       SUBJECTIVE:    History of present illness:  I saw the patient in the office today as a consultation for colonoscopy and EGD.  He states he is not having any abdominal related symptoms. Patient states this is routine. He states his last colonoscopy was approximately 10 years ago and there were polyps found. He denies any family history of colon cancer.  Patient has moderate reflux symptoms, takes PPI medication daily which seems to control it.  Last EGD and colonoscopy over 10 years ago.  No weight loss or anemia.  No rectal bleeding.    The following portions of the patient's history were reviewed and updated as appropriate: allergies, current medications, past family history, past medical history, past social history, past surgical history and problem list.    Review of Systems   Constitutional:  Negative for chills, fever and unexpected weight change.   HENT:  Negative for trouble swallowing and voice change.    Eyes:  Negative for visual disturbance.   Respiratory:  Negative for apnea, cough, chest tightness, shortness of breath and wheezing.    Cardiovascular:  Negative for chest pain, palpitations and leg swelling.   Gastrointestinal:  Negative for abdominal distention, abdominal pain, anal bleeding, blood in stool, constipation, diarrhea, nausea, rectal pain and vomiting.   Endocrine: Negative for cold intolerance and heat intolerance.   Genitourinary:  Negative for difficulty urinating, dysuria, flank pain, scrotal swelling and testicular pain.   Musculoskeletal:  Negative for back pain, gait problem and joint swelling.   Skin:  Negative for color change, rash and wound.   Neurological:  Negative for dizziness, syncope, speech difficulty, weakness, numbness and headaches.   Hematological:   Negative for adenopathy. Does not bruise/bleed easily.   Psychiatric/Behavioral:  Negative for confusion. The patient is not nervous/anxious.      History:  Past Medical History:   Diagnosis Date    GERD (gastroesophageal reflux disease)     High cholesterol     Hypertension     Renal disease        Past Surgical History:   Procedure Laterality Date    HEMORRHOIDECTOMY      HERNIA REPAIR         Family History   Problem Relation Age of Onset    Prostate cancer Father     No Known Problems Mother     Diabetes Paternal Uncle     Cancer Maternal Grandfather        Social History     Tobacco Use    Smoking status: Never    Smokeless tobacco: Never   Substance Use Topics    Alcohol use: No     Comment: PRIOR ABUSE IN HIS EARLY 20'S    Drug use: No       Medications:   Current Outpatient Medications:     amLODIPine (NORVASC) 10 MG tablet, , Disp: , Rfl: 5    CRESTOR 10 MG tablet, , Disp: , Rfl: 5    diphenhydrAMINE (BENADRYL) 25 mg capsule, Take 1 capsule by mouth Every 6 (Six) Hours As Needed for Itching., Disp: , Rfl:     docusate sodium (COLACE) 50 MG capsule, Take  by mouth 2 (Two) Times a Day., Disp: , Rfl:     esomeprazole (nexIUM) 40 MG capsule, , Disp: , Rfl: 5    Loratadine 10 MG capsule, , Disp: , Rfl: 5    multivitamin with minerals tablet tablet, Take 1 tablet by mouth Daily., Disp: , Rfl:     Omega-3 Fatty Acids (fish oil) 1000 MG capsule capsule, Take  by mouth Daily With Breakfast., Disp: , Rfl:     silodosin (RAPAFLO) 8 MG capsule capsule, Take 1 capsule by mouth Daily With Breakfast., Disp: 30 capsule, Rfl: 11    levocetirizine (XYZAL) 5 MG tablet, Take 5 mg by mouth Every Evening. (Patient not taking: Reported on 7/7/2023), Disp: , Rfl:        Allergies:   Allergies   Allergen Reactions    Neosporin [Bacitracin-Polymyxin B] Rash       OBJECTIVE:    Vital Signs:  Vitals:    07/07/23 1421   BP: 158/88   BP Location: Left arm   Patient Position: Sitting   Cuff Size: Adult   Pulse: 91   Temp: 96.9 °F (36.1  "°C)   TempSrc: Temporal   SpO2: 98%   Weight: 77.3 kg (170 lb 6.4 oz)   Height: 175.3 cm (69\")       Physical Exam:   General Appearance:    Alert, cooperative, in no acute distress   Head:    Normocephalic, without obvious abnormality, atraumatic   Eyes:            Lids and lashes normal, conjunctivae and sclerae normal, no   icterus, no pallor, corneas clear, PERRL   Ears:    Ears appear intact with no abnormalities noted   Throat:   No oral lesions, no thrush, oral mucosa moist   Neck:   No adenopathy, supple, trachea midline, no thyromegaly,  no JVD   Lungs:     Clear to auscultation,respirations regular, even and                  unlabored    Heart:    Regular rhythm and normal rate, normal S1 and S2, no            murmur   Abdomen:     no masses, no organomegaly, soft non-tender, non-distended, no guarding.  No abdominal wall hernias or masses.   Extremities:   Moves all extremities well, no edema, no cyanosis, no             redness   Pulses:   Pulses palpable and equal bilaterally   Skin:   No bleeding, bruising or rash   Lymph nodes:   No palpable adenopathy   Neurologic:   Cranial nerves 2 - 12 grossly intact, sensation intact  Psychiatric: No evidence of depression or anxiety   Results Review:   I reviewed the patient's new clinical results.    Review of Systems was reviewed and confirmed as accurate as documented by the MA.    ASSESSMENT/PLAN:    1. Drug-induced constipation    2. Gastroesophageal reflux disease, unspecified whether esophagitis present        I recommend a colonoscopy and EGD for further evaluation. The procedure was explained as well as the risks which include but are not limited to bleeding, infection, perforation, abdominal pain etc. The patient understands these risks and the procedure and wishes to proceed.  Avoid caffeine alcohol and nicotine.  Continue PPI medication.  Continue MiraLAX and Metamucil for chronic constipation with increased water intake.     Electronically signed by " Geovany Moore MD  07/07/23  13:25 EDT

## 2023-07-10 PROBLEM — K59.03 DRUG-INDUCED CONSTIPATION: Status: ACTIVE | Noted: 2023-07-10

## 2023-07-10 PROBLEM — K21.9 GASTROESOPHAGEAL REFLUX DISEASE: Status: ACTIVE | Noted: 2023-07-10

## 2023-07-25 ENCOUNTER — TELEPHONE (OUTPATIENT)
Dept: SURGERY | Facility: CLINIC | Age: 78
End: 2023-07-25
Payer: MEDICARE

## 2023-07-27 RX ORDER — FEXOFENADINE HCL AND PSEUDOEPHEDRINE HCI 60; 120 MG/1; MG/1
1 TABLET, EXTENDED RELEASE ORAL 2 TIMES DAILY
COMMUNITY

## 2023-07-27 NOTE — PRE-PROCEDURE INSTRUCTIONS
PAT phone history completed with pt for upcoming procedure on 7/28/23 with Dr. Moore.      PAT PASS GIVEN/REVIEWED WITH PT.  VERBALIZED UNDERSTANDING OF THE FOLLOWING:  DO NOT EAT, DRINK, SMOKE, USE SMOKELESS TOBACCO OR CHEW GUM AFTER MIDNIGHT THE NIGHT BEFORE SURGERY.  THIS ALSO INCLUDES HARD CANDIES AND MINTS.    DO NOT SHAVE THE AREA TO BE OPERATED ON AT LEAST 48 HOURS PRIOR TO THE PROCEDURE.  DO NOT WEAR MAKE UP OR NAIL POLISH.  DO NOT LEAVE IN ANY PIERCING OR WEAR JEWELRY THE DAY OF SURGERY.      DO NOT USE ADHESIVES IF YOU WEAR DENTURES.    DO NOT WEAR EYE CONTACTS; BRING IN YOUR GLASSES.    ONLY TAKE MEDICATION THE MORNING OF YOUR PROCEDURE IF INSTRUCTED BY YOUR SURGEON WITH ENOUGH WATER TO SWALLOW THE MEDICATION.  IF YOUR SURGEON DID NOT SPECIFY WHICH MEDICATIONS TO TAKE, YOU WILL NEED TO CALL THEIR OFFICE FOR FURTHER INSTRUCTIONS AND DO AS THEY INSTRUCT.    LEAVE ANYTHING YOU CONSIDER VALUABLE AT HOME.    YOU WILL NEED TO ARRANGE FOR SOMEONE TO DRIVE YOU HOME AFTER SURGERY.  IT IS RECOMMENDED THAT YOU DO NOT DRIVE, WORK, DRINK ALCOHOL OR MAKE MAJOR DECISIONS FOR AT LEAST 24 HOURS AFTER YOUR PROCEDURE IS COMPLETE.      THE DAY OF YOUR PROCEDURE, BRING IN THE FOLLOWING IF APPLICABLE:   PICTURE ID AND INSURANCE/MEDICARE OR MEDICAID CARDS/ANY CO-PAY THAT MAY BE DUE   COPY OF ADVANCED DIRECTIVE/LIVING WILL/POWER OR    CPAP/BIPAP/INHALERS   SKIN PREP SHEET   YOUR PREADMISSION TESTING PASS (IF NOT A PHONE HISTORY)

## 2023-07-28 ENCOUNTER — ANESTHESIA EVENT (OUTPATIENT)
Dept: GASTROENTEROLOGY | Facility: HOSPITAL | Age: 78
End: 2023-07-28
Payer: MEDICARE

## 2023-07-28 ENCOUNTER — HOSPITAL ENCOUNTER (OUTPATIENT)
Facility: HOSPITAL | Age: 78
Setting detail: HOSPITAL OUTPATIENT SURGERY
Discharge: HOME | End: 2023-07-28
Attending: SURGERY | Admitting: SURGERY
Payer: MEDICARE

## 2023-07-28 ENCOUNTER — ANESTHESIA (OUTPATIENT)
Dept: GASTROENTEROLOGY | Facility: HOSPITAL | Age: 78
End: 2023-07-28
Payer: MEDICARE

## 2023-07-28 VITALS
SYSTOLIC BLOOD PRESSURE: 116 MMHG | DIASTOLIC BLOOD PRESSURE: 75 MMHG | HEART RATE: 74 BPM | TEMPERATURE: 97.2 F | BODY MASS INDEX: 23.99 KG/M2 | OXYGEN SATURATION: 96 % | HEIGHT: 69 IN | WEIGHT: 162 LBS | RESPIRATION RATE: 16 BRPM

## 2023-07-28 DIAGNOSIS — K59.03 DRUG-INDUCED CONSTIPATION: ICD-10-CM

## 2023-07-28 DIAGNOSIS — K21.9 GASTROESOPHAGEAL REFLUX DISEASE, UNSPECIFIED WHETHER ESOPHAGITIS PRESENT: ICD-10-CM

## 2023-07-28 PROCEDURE — 25010000002 PROPOFOL 1000 MG/100ML EMULSION: Performed by: NURSE ANESTHETIST, CERTIFIED REGISTERED

## 2023-07-28 PROCEDURE — 25010000002 ONDANSETRON PER 1 MG: Performed by: NURSE ANESTHETIST, CERTIFIED REGISTERED

## 2023-07-28 PROCEDURE — 88305 TISSUE EXAM BY PATHOLOGIST: CPT

## 2023-07-28 RX ORDER — LIDOCAINE HYDROCHLORIDE 20 MG/ML
INJECTION, SOLUTION INTRAVENOUS AS NEEDED
Status: DISCONTINUED | OUTPATIENT
Start: 2023-07-28 | End: 2023-07-28 | Stop reason: SURG

## 2023-07-28 RX ORDER — PROPOFOL 10 MG/ML
INJECTION, EMULSION INTRAVENOUS AS NEEDED
Status: DISCONTINUED | OUTPATIENT
Start: 2023-07-28 | End: 2023-07-28 | Stop reason: SURG

## 2023-07-28 RX ORDER — SODIUM CHLORIDE, SODIUM LACTATE, POTASSIUM CHLORIDE, CALCIUM CHLORIDE 600; 310; 30; 20 MG/100ML; MG/100ML; MG/100ML; MG/100ML
1000 INJECTION, SOLUTION INTRAVENOUS CONTINUOUS
Status: DISCONTINUED | OUTPATIENT
Start: 2023-07-28 | End: 2023-07-28 | Stop reason: HOSPADM

## 2023-07-28 RX ORDER — ONDANSETRON 2 MG/ML
4 INJECTION INTRAMUSCULAR; INTRAVENOUS ONCE AS NEEDED
Status: DISCONTINUED | OUTPATIENT
Start: 2023-07-28 | End: 2023-07-28 | Stop reason: HOSPADM

## 2023-07-28 RX ORDER — ONDANSETRON 2 MG/ML
INJECTION INTRAMUSCULAR; INTRAVENOUS AS NEEDED
Status: DISCONTINUED | OUTPATIENT
Start: 2023-07-28 | End: 2023-07-28 | Stop reason: SURG

## 2023-07-28 RX ADMIN — ONDANSETRON 4 MG: 2 INJECTION INTRAMUSCULAR; INTRAVENOUS at 10:29

## 2023-07-28 RX ADMIN — PROPOFOL 100 MG: 10 INJECTION, EMULSION INTRAVENOUS at 10:05

## 2023-07-28 RX ADMIN — LIDOCAINE HYDROCHLORIDE 50 MG: 20 INJECTION, SOLUTION INTRAVENOUS at 10:05

## 2023-07-28 RX ADMIN — SODIUM CHLORIDE, POTASSIUM CHLORIDE, SODIUM LACTATE AND CALCIUM CHLORIDE 1000 ML: 600; 310; 30; 20 INJECTION, SOLUTION INTRAVENOUS at 09:19

## 2023-07-28 RX ADMIN — PROPOFOL 50 MG: 10 INJECTION, EMULSION INTRAVENOUS at 10:14

## 2023-07-28 RX ADMIN — PROPOFOL 50 MG: 10 INJECTION, EMULSION INTRAVENOUS at 10:27

## 2023-07-28 RX ADMIN — PROPOFOL 50 MG: 10 INJECTION, EMULSION INTRAVENOUS at 10:20

## 2023-07-28 RX ADMIN — PROPOFOL 50 MG: 10 INJECTION, EMULSION INTRAVENOUS at 10:10

## 2023-07-28 NOTE — ANESTHESIA PREPROCEDURE EVALUATION
Anesthesia Evaluation     Patient summary reviewed and Nursing notes reviewed   no history of anesthetic complications:   NPO Solid Status: > 8 hours  NPO Liquid Status: > 8 hours           Airway   Mallampati: II  TM distance: >3 FB  Neck ROM: full  No difficulty expected  Dental - normal exam     Pulmonary - negative pulmonary ROS and normal exam   Cardiovascular - normal exam  Exercise tolerance: good (4-7 METS)    (+) hypertension well controlled less than 2 medicationshyperlipidemia      Neuro/Psych- negative ROS  GI/Hepatic/Renal/Endo    (+) GERD poorly controlled, renal disease stones    Musculoskeletal (-) negative ROS    Abdominal    Substance History - negative use     OB/GYN          Other - negative ROS                     Anesthesia Plan    ASA 3     MAC     (Risks and benefits discussed including risk of aspiration, recall and dental damage. All patient questions answered. Will continue with POC.)  intravenous induction     Anesthetic plan, risks, benefits, and alternatives have been provided, discussed and informed consent has been obtained with: patient.  Pre-procedure education provided    CODE STATUS:

## 2023-07-28 NOTE — DISCHARGE INSTRUCTIONS
No pushing, pulling, tugging,  heavy lifting, or strenuous activity.  No major decision making, driving, or drinking alcoholic beverages for 24 hours. ( due to the medications you have  received)  Always use good hand hygiene/washing techniques.  NO driving while taking pain medications.    * if you have an incision:  Check your incision area every day for signs of infection.   Check for:  * more redness, swelling, or pain  *more fluid or blood  *warmth  *pus or bad smellTo assist you in voiding:  Drink plenty of fluids  Listen to running water while attempting to void.    If you are unable to urinate and you have an uncomfortable urge to void or it has been   6 hours since you were discharged, return to the Emergency Room

## 2023-07-28 NOTE — ANESTHESIA POSTPROCEDURE EVALUATION
Patient: Johnson Paredes    Procedure Summary       Date: 07/28/23 Room / Location: [facility] 2 / [facility]    Anesthesia Start: 1000 Anesthesia Stop: 1041    Procedures:       ESOPHAGOGASTRODUODENOSCOPY (Esophagus)      COLONOSCOPY Diagnosis:       Drug-induced constipation      Gastroesophageal reflux disease, unspecified whether esophagitis present      (Drug-induced constipation [K59.03])      (Gastroesophageal reflux disease, unspecified whether esophagitis present [K21.9])    Surgeons: Geovany Moore MD Provider: Michelle Wilson CRNA    Anesthesia Type: MAC ASA Status: 3            Anesthesia Type: MAC    Vitals  Vitals Value Taken Time   /75 07/28/23 1113   Temp 97.2 °F (36.2 °C) 07/28/23 1113   Pulse 74 07/28/23 1113   Resp 16 07/28/23 1113   SpO2 96 % 07/28/23 1113           Post Anesthesia Care and Evaluation    Patient location during evaluation: PHASE II  Patient participation: complete - patient participated  Level of consciousness: awake and alert  Pain score: 0  Pain management: satisfactory to patient    Airway patency: patent  Anesthetic complications: No anesthetic complications  PONV Status: none  Cardiovascular status: acceptable and stable  Respiratory status: acceptable  Hydration status: acceptable    Comments: Vitals signs as noted in nursing documentation as per protocol.

## 2023-07-31 LAB — REF LAB TEST METHOD: NORMAL

## (undated) DEVICE — CONMED SCOPE SAVER BITE BLOCK, 20X27 MM: Brand: SCOPE SAVER

## (undated) DEVICE — GLV SURG TRIUMPH MICRO PF LTX 7.5 STRL

## (undated) DEVICE — Device

## (undated) DEVICE — LUBE JELLY PK/2.75GM STRL BX/144

## (undated) DEVICE — PATIENT RETURN ELECTRODE, SINGLE-USE, CONTACT QUALITY MONITORING, ADULT, WITH 9FT CORD, FOR PATIENTS WEIGING OVER 33LBS. (15KG): Brand: MEGADYNE

## (undated) DEVICE — GLV SURG SENSICARE W/ALOE PF LF 7.5 STRL

## (undated) DEVICE — ENDOSCOPY PORT CONNECTOR FOR OLYMPUS® SCOPES: Brand: ERBE

## (undated) DEVICE — VLV SXN AIR/H2O ORCAPOD3 1P/U STRL

## (undated) DEVICE — MEDI-VAC NON-CONDUCTIVE SUCTION TUBING: Brand: CARDINAL HEALTH

## (undated) DEVICE — FRCP BX RADJAW4 NDL 2.8 240 STD OG

## (undated) DEVICE — HYBRID TUBING/CAP SET FOR OLYMPUS® SCOPES: Brand: ERBE